# Patient Record
Sex: MALE | Race: WHITE
[De-identification: names, ages, dates, MRNs, and addresses within clinical notes are randomized per-mention and may not be internally consistent; named-entity substitution may affect disease eponyms.]

---

## 2018-11-13 ENCOUNTER — HOSPITAL ENCOUNTER (OUTPATIENT)
Dept: HOSPITAL 89 - US | Age: 68
End: 2018-11-13
Attending: NURSE PRACTITIONER
Payer: COMMERCIAL

## 2018-11-13 DIAGNOSIS — E04.1: Primary | ICD-10-CM

## 2018-11-13 PROCEDURE — 76536 US EXAM OF HEAD AND NECK: CPT

## 2018-11-13 NOTE — RADIOLOGY IMAGING REPORT
FACILITY: [facility] [place] 

 

PATIENT NAME: Roberto Simmons

: 1950

MR: [id number]

V: [id number]

EXAM DATE: 

ORDERING PHYSICIAN: FAVIOLA LOPEZ

TECHNOLOGIST: 

 

Location: [facility]

Patient: Roberto Simmons

: 1950

MRN: [id number]

Visit/Account:[id number]

Date of Sevice: 2018

 

ACCESSION #: [id number]

 

THYROID

EXAMINATION:   Thyroid ultrasound

 

HISTORY:   Hypothyroidism

 

COMPARISON:   None.

 

FINDINGS:

Thyroid size:

      Right lobe right lobe measures 4.5 x 2.4 x 1.3 cm

      Left lobe left lobe measures 3.5 x 0.9 x 1.1 cm

      Isthmus 0.3 cm

Thyroid nodules:

      Right lobe - heterogenous appearing thyroid.  No focal lesions identified.

      Left lobe - heterogenous appearing thyroid gland.  No focal concerning lesions identified.  Sma
ll focus of ill-defined hypoechoic roundish diminished attenuation seen in the central aspect of the 
left thyroid gland measuring 3 x 4.5 mm by the technologist.  This probably does not even represent a
 lesion.

      Isthmus - none

Thyroid vascularity: normal

 

IMPRESSION:

1.  Heterogenous thyroid glands bilaterally.  No concerning lesions identified.  Small focus of dimin
ished attenuation in the central aspect of the left thyroid gland probably represents a pseudolesion.


 

Report Dictated By: Fan Siddiqui MD at 2018 9:57 AM

 

Report E-Signed By: Fan Siddiqui MD  at 2018 10:07 AM

 

WSN:[id number]